# Patient Record
Sex: MALE | Race: WHITE | NOT HISPANIC OR LATINO | URBAN - METROPOLITAN AREA
[De-identification: names, ages, dates, MRNs, and addresses within clinical notes are randomized per-mention and may not be internally consistent; named-entity substitution may affect disease eponyms.]

---

## 2024-06-27 ENCOUNTER — INPATIENT (INPATIENT)
Facility: HOSPITAL | Age: 29
LOS: 1 days | Discharge: HOME | DRG: 392 | End: 2024-06-29
Attending: SURGERY | Admitting: SURGERY
Payer: MEDICAID

## 2024-06-27 VITALS
SYSTOLIC BLOOD PRESSURE: 140 MMHG | WEIGHT: 220.02 LBS | DIASTOLIC BLOOD PRESSURE: 88 MMHG | TEMPERATURE: 98 F | HEIGHT: 68 IN | OXYGEN SATURATION: 99 % | HEART RATE: 87 BPM | RESPIRATION RATE: 18 BRPM

## 2024-06-27 DIAGNOSIS — R10.9 UNSPECIFIED ABDOMINAL PAIN: ICD-10-CM

## 2024-06-27 LAB
ALBUMIN SERPL ELPH-MCNC: 4.4 G/DL — SIGNIFICANT CHANGE UP (ref 3.3–5)
ALP SERPL-CCNC: 107 U/L — SIGNIFICANT CHANGE UP (ref 40–120)
ALT FLD-CCNC: 51 U/L — HIGH (ref 10–45)
ANION GAP SERPL CALC-SCNC: 14 MMOL/L — SIGNIFICANT CHANGE UP (ref 5–17)
APPEARANCE UR: CLEAR — SIGNIFICANT CHANGE UP
AST SERPL-CCNC: 20 U/L — SIGNIFICANT CHANGE UP (ref 10–40)
BACTERIA # UR AUTO: ABNORMAL /HPF
BASOPHILS # BLD AUTO: 0.05 K/UL — SIGNIFICANT CHANGE UP (ref 0–0.2)
BASOPHILS NFR BLD AUTO: 0.3 % — SIGNIFICANT CHANGE UP (ref 0–2)
BILIRUB SERPL-MCNC: 0.2 MG/DL — SIGNIFICANT CHANGE UP (ref 0.2–1.2)
BILIRUB UR-MCNC: NEGATIVE — SIGNIFICANT CHANGE UP
BUN SERPL-MCNC: 8 MG/DL — SIGNIFICANT CHANGE UP (ref 7–23)
CALCIUM SERPL-MCNC: 9.7 MG/DL — SIGNIFICANT CHANGE UP (ref 8.4–10.5)
CAST: 0 /LPF — SIGNIFICANT CHANGE UP (ref 0–4)
CHLORIDE SERPL-SCNC: 100 MMOL/L — SIGNIFICANT CHANGE UP (ref 96–108)
CO2 SERPL-SCNC: 25 MMOL/L — SIGNIFICANT CHANGE UP (ref 22–31)
COLOR SPEC: YELLOW — SIGNIFICANT CHANGE UP
CREAT SERPL-MCNC: 0.94 MG/DL — SIGNIFICANT CHANGE UP (ref 0.5–1.3)
DIFF PNL FLD: NEGATIVE — SIGNIFICANT CHANGE UP
EGFR: 113 ML/MIN/1.73M2 — SIGNIFICANT CHANGE UP
EGFR: 113 ML/MIN/1.73M2 — SIGNIFICANT CHANGE UP
EOSINOPHIL # BLD AUTO: 0.29 K/UL — SIGNIFICANT CHANGE UP (ref 0–0.5)
EOSINOPHIL NFR BLD AUTO: 1.8 % — SIGNIFICANT CHANGE UP (ref 0–6)
GLUCOSE SERPL-MCNC: 104 MG/DL — HIGH (ref 70–99)
GLUCOSE UR QL: NEGATIVE MG/DL — SIGNIFICANT CHANGE UP
HCT VFR BLD CALC: 41.2 % — SIGNIFICANT CHANGE UP (ref 39–50)
HGB BLD-MCNC: 13.6 G/DL — SIGNIFICANT CHANGE UP (ref 13–17)
IMM GRANULOCYTES NFR BLD AUTO: 1.1 % — HIGH (ref 0–0.9)
KETONES UR-MCNC: NEGATIVE MG/DL — SIGNIFICANT CHANGE UP
LEUKOCYTE ESTERASE UR-ACNC: ABNORMAL
LIDOCAIN IGE QN: 16 U/L — SIGNIFICANT CHANGE UP (ref 7–60)
LYMPHOCYTES # BLD AUTO: 14.9 % — SIGNIFICANT CHANGE UP (ref 13–44)
LYMPHOCYTES # BLD AUTO: 2.4 K/UL — SIGNIFICANT CHANGE UP (ref 1–3.3)
MCHC RBC-ENTMCNC: 28.9 PG — SIGNIFICANT CHANGE UP (ref 27–34)
MCHC RBC-ENTMCNC: 33 GM/DL — SIGNIFICANT CHANGE UP (ref 32–36)
MCV RBC AUTO: 87.7 FL — SIGNIFICANT CHANGE UP (ref 80–100)
MONOCYTES # BLD AUTO: 0.81 K/UL — SIGNIFICANT CHANGE UP (ref 0–0.9)
MONOCYTES NFR BLD AUTO: 5 % — SIGNIFICANT CHANGE UP (ref 2–14)
NEUTROPHILS # BLD AUTO: 12.4 K/UL — HIGH (ref 1.8–7.4)
NEUTROPHILS NFR BLD AUTO: 76.9 % — SIGNIFICANT CHANGE UP (ref 43–77)
NITRITE UR-MCNC: NEGATIVE — SIGNIFICANT CHANGE UP
NRBC # BLD: 0 /100 WBCS — SIGNIFICANT CHANGE UP (ref 0–0)
NRBC BLD-RTO: 0 /100 WBCS — SIGNIFICANT CHANGE UP (ref 0–0)
PH UR: 5.5 — SIGNIFICANT CHANGE UP (ref 5–8)
PLATELET # BLD AUTO: 354 K/UL — SIGNIFICANT CHANGE UP (ref 150–400)
POTASSIUM SERPL-MCNC: 4.3 MMOL/L — SIGNIFICANT CHANGE UP (ref 3.5–5.3)
POTASSIUM SERPL-SCNC: 4.3 MMOL/L — SIGNIFICANT CHANGE UP (ref 3.5–5.3)
PROT SERPL-MCNC: 7.9 G/DL — SIGNIFICANT CHANGE UP (ref 6–8.3)
PROT UR-MCNC: NEGATIVE MG/DL — SIGNIFICANT CHANGE UP
RBC # BLD: 4.7 M/UL — SIGNIFICANT CHANGE UP (ref 4.2–5.8)
RBC # FLD: 12.3 % — SIGNIFICANT CHANGE UP (ref 10.3–14.5)
RBC CASTS # UR COMP ASSIST: 0 /HPF — SIGNIFICANT CHANGE UP (ref 0–4)
SODIUM SERPL-SCNC: 139 MMOL/L — SIGNIFICANT CHANGE UP (ref 135–145)
SP GR SPEC: 1.02 — SIGNIFICANT CHANGE UP (ref 1–1.03)
SQUAMOUS # UR AUTO: 6 /HPF — HIGH (ref 0–5)
TROPONIN T, HIGH SENSITIVITY RESULT: <6 NG/L — SIGNIFICANT CHANGE UP (ref 0–51)
UROBILINOGEN FLD QL: 0.2 MG/DL — SIGNIFICANT CHANGE UP (ref 0.2–1)
WBC # BLD: 16.12 K/UL — HIGH (ref 3.8–10.5)
WBC # FLD AUTO: 16.12 K/UL — HIGH (ref 3.8–10.5)
WBC UR QL: 21 /HPF — HIGH (ref 0–5)

## 2024-06-27 PROCEDURE — 71046 X-RAY EXAM CHEST 2 VIEWS: CPT | Mod: 26

## 2024-06-27 PROCEDURE — 74177 CT ABD & PELVIS W/CONTRAST: CPT | Mod: 26,MC

## 2024-06-27 PROCEDURE — 76705 ECHO EXAM OF ABDOMEN: CPT | Mod: 26

## 2024-06-27 PROCEDURE — 99222 1ST HOSP IP/OBS MODERATE 55: CPT | Mod: 57

## 2024-06-27 PROCEDURE — 99285 EMERGENCY DEPT VISIT HI MDM: CPT

## 2024-06-27 RX ORDER — ESTRADIOL 0.05MG/24H
4 PATCH, TRANSDERMAL SEMIWEEKLY TRANSDERMAL
Refills: 0 | Status: DISCONTINUED | OUTPATIENT
Start: 2024-06-27 | End: 2024-06-29

## 2024-06-27 RX ORDER — SPIRONOLACTONE 25 MG
1 TABLET ORAL
Refills: 0 | DISCHARGE

## 2024-06-27 RX ORDER — PIPERACILLIN-TAZO-DEXTROSE,ISO 3.375G/5
3.38 IV SOLUTION, PIGGYBACK PREMIX FROZEN(ML) INTRAVENOUS ONCE
Refills: 0 | Status: COMPLETED | OUTPATIENT
Start: 2024-06-27 | End: 2024-06-27

## 2024-06-27 RX ORDER — ACETAMINOPHEN 500 MG/5ML
1000 LIQUID (ML) ORAL ONCE
Refills: 0 | Status: COMPLETED | OUTPATIENT
Start: 2024-06-27 | End: 2024-06-27

## 2024-06-27 RX ORDER — ONDANSETRON HCL/PF 4 MG/2 ML
4 VIAL (ML) INJECTION ONCE
Refills: 0 | Status: COMPLETED | OUTPATIENT
Start: 2024-06-27 | End: 2024-06-27

## 2024-06-27 RX ORDER — ESTRADIOL 0.05MG/24H
4 PATCH, TRANSDERMAL SEMIWEEKLY TRANSDERMAL
Refills: 0 | DISCHARGE

## 2024-06-27 RX ORDER — ENOXAPARIN SODIUM 100 MG/ML
40 INJECTION SUBCUTANEOUS EVERY 24 HOURS
Refills: 0 | Status: DISCONTINUED | OUTPATIENT
Start: 2024-06-27 | End: 2024-06-29

## 2024-06-27 RX ORDER — MAGNESIUM, ALUMINUM HYDROXIDE 200-200 MG
30 TABLET,CHEWABLE ORAL ONCE
Refills: 0 | Status: COMPLETED | OUTPATIENT
Start: 2024-06-27 | End: 2024-06-27

## 2024-06-27 RX ORDER — PIPERACILLIN-TAZO-DEXTROSE,ISO 3.375G/5
3.38 IV SOLUTION, PIGGYBACK PREMIX FROZEN(ML) INTRAVENOUS EVERY 8 HOURS
Refills: 0 | Status: DISCONTINUED | OUTPATIENT
Start: 2024-06-28 | End: 2024-06-29

## 2024-06-27 RX ORDER — SPIRONOLACTONE 25 MG
100 TABLET ORAL
Refills: 0 | Status: DISCONTINUED | OUTPATIENT
Start: 2024-06-27 | End: 2024-06-29

## 2024-06-27 RX ADMIN — Medication 25 GRAM(S): at 23:47

## 2024-06-27 RX ADMIN — Medication 4 MILLIGRAM(S): at 04:28

## 2024-06-27 RX ADMIN — Medication 4 MILLIGRAM(S): at 23:58

## 2024-06-27 RX ADMIN — Medication 200 GRAM(S): at 20:35

## 2024-06-27 RX ADMIN — Medication 400 MILLIGRAM(S): at 03:28

## 2024-06-27 RX ADMIN — Medication 1000 MILLILITER(S): at 03:29

## 2024-06-27 RX ADMIN — Medication 4 MILLIGRAM(S): at 04:16

## 2024-06-27 RX ADMIN — Medication 20 MILLIGRAM(S): at 03:28

## 2024-06-27 RX ADMIN — Medication 400 MILLIGRAM(S): at 11:41

## 2024-06-27 RX ADMIN — Medication 30 MILLILITER(S): at 03:28

## 2024-06-27 RX ADMIN — Medication 100 MILLIGRAM(S): at 23:58

## 2024-06-27 RX ADMIN — Medication 4 MILLIGRAM(S): at 03:28

## 2024-06-28 LAB
ALBUMIN SERPL ELPH-MCNC: 4.1 G/DL — SIGNIFICANT CHANGE UP (ref 3.3–5)
ALP SERPL-CCNC: 102 U/L — SIGNIFICANT CHANGE UP (ref 40–120)
ALT FLD-CCNC: 42 U/L — SIGNIFICANT CHANGE UP (ref 10–45)
ANION GAP SERPL CALC-SCNC: 13 MMOL/L — SIGNIFICANT CHANGE UP (ref 5–17)
APTT BLD: 29.3 SEC — SIGNIFICANT CHANGE UP (ref 24.5–35.6)
AST SERPL-CCNC: 22 U/L — SIGNIFICANT CHANGE UP (ref 10–40)
BILIRUB SERPL-MCNC: 0.5 MG/DL — SIGNIFICANT CHANGE UP (ref 0.2–1.2)
BLD GP AB SCN SERPL QL: NEGATIVE — SIGNIFICANT CHANGE UP
BUN SERPL-MCNC: 8 MG/DL — SIGNIFICANT CHANGE UP (ref 7–23)
CALCIUM SERPL-MCNC: 9.5 MG/DL — SIGNIFICANT CHANGE UP (ref 8.4–10.5)
CHLORIDE SERPL-SCNC: 100 MMOL/L — SIGNIFICANT CHANGE UP (ref 96–108)
CO2 SERPL-SCNC: 24 MMOL/L — SIGNIFICANT CHANGE UP (ref 22–31)
CREAT SERPL-MCNC: 0.96 MG/DL — SIGNIFICANT CHANGE UP (ref 0.5–1.3)
EGFR: 110 ML/MIN/1.73M2 — SIGNIFICANT CHANGE UP
EGFR: 110 ML/MIN/1.73M2 — SIGNIFICANT CHANGE UP
GLUCOSE SERPL-MCNC: 84 MG/DL — SIGNIFICANT CHANGE UP (ref 70–99)
HCT VFR BLD CALC: 39 % — SIGNIFICANT CHANGE UP (ref 39–50)
HGB BLD-MCNC: 12.8 G/DL — LOW (ref 13–17)
INR BLD: 1.04 RATIO — SIGNIFICANT CHANGE UP (ref 0.85–1.18)
MAGNESIUM SERPL-MCNC: 2.3 MG/DL — SIGNIFICANT CHANGE UP (ref 1.6–2.6)
MCHC RBC-ENTMCNC: 28.6 PG — SIGNIFICANT CHANGE UP (ref 27–34)
MCHC RBC-ENTMCNC: 32.8 GM/DL — SIGNIFICANT CHANGE UP (ref 32–36)
MCV RBC AUTO: 87.2 FL — SIGNIFICANT CHANGE UP (ref 80–100)
NRBC # BLD: 0 /100 WBCS — SIGNIFICANT CHANGE UP (ref 0–0)
NRBC BLD-RTO: 0 /100 WBCS — SIGNIFICANT CHANGE UP (ref 0–0)
PHOSPHATE SERPL-MCNC: 2.8 MG/DL — SIGNIFICANT CHANGE UP (ref 2.5–4.5)
PLATELET # BLD AUTO: 337 K/UL — SIGNIFICANT CHANGE UP (ref 150–400)
POTASSIUM SERPL-MCNC: 3.9 MMOL/L — SIGNIFICANT CHANGE UP (ref 3.5–5.3)
POTASSIUM SERPL-SCNC: 3.9 MMOL/L — SIGNIFICANT CHANGE UP (ref 3.5–5.3)
PROT SERPL-MCNC: 7.4 G/DL — SIGNIFICANT CHANGE UP (ref 6–8.3)
PROTHROM AB SERPL-ACNC: 11.4 SEC — SIGNIFICANT CHANGE UP (ref 9.5–13)
RBC # BLD: 4.47 M/UL — SIGNIFICANT CHANGE UP (ref 4.2–5.8)
RBC # FLD: 12.4 % — SIGNIFICANT CHANGE UP (ref 10.3–14.5)
RH IG SCN BLD-IMP: POSITIVE — SIGNIFICANT CHANGE UP
SODIUM SERPL-SCNC: 137 MMOL/L — SIGNIFICANT CHANGE UP (ref 135–145)
WBC # BLD: 11.82 K/UL — HIGH (ref 3.8–10.5)
WBC # FLD AUTO: 11.82 K/UL — HIGH (ref 3.8–10.5)

## 2024-06-28 PROCEDURE — 78830 RP LOCLZJ TUM SPECT W/CT 1: CPT | Mod: 26

## 2024-06-28 PROCEDURE — 78227 HEPATOBIL SYST IMAGE W/DRUG: CPT | Mod: 26

## 2024-06-28 RX ADMIN — Medication 25 GRAM(S): at 14:21

## 2024-06-28 RX ADMIN — Medication 25 GRAM(S): at 05:49

## 2024-06-28 RX ADMIN — Medication 100 MILLIGRAM(S): at 19:01

## 2024-06-28 RX ADMIN — ENOXAPARIN SODIUM 40 MILLIGRAM(S): 100 INJECTION SUBCUTANEOUS at 05:49

## 2024-06-28 RX ADMIN — Medication 4 MILLIGRAM(S): at 14:19

## 2024-06-28 RX ADMIN — Medication 100 MILLIGRAM(S): at 14:19

## 2024-06-28 RX ADMIN — Medication 25 GRAM(S): at 21:07

## 2024-06-28 RX ADMIN — Medication 4 MILLIGRAM(S): at 19:01

## 2024-06-29 VITALS
OXYGEN SATURATION: 96 % | TEMPERATURE: 98 F | DIASTOLIC BLOOD PRESSURE: 74 MMHG | HEART RATE: 70 BPM | RESPIRATION RATE: 18 BRPM | SYSTOLIC BLOOD PRESSURE: 114 MMHG

## 2024-06-29 LAB
ALBUMIN SERPL ELPH-MCNC: 4.4 G/DL — SIGNIFICANT CHANGE UP (ref 3.3–5)
ALP SERPL-CCNC: 112 U/L — SIGNIFICANT CHANGE UP (ref 40–120)
ALT FLD-CCNC: 41 U/L — SIGNIFICANT CHANGE UP (ref 10–45)
ANION GAP SERPL CALC-SCNC: 13 MMOL/L — SIGNIFICANT CHANGE UP (ref 5–17)
AST SERPL-CCNC: 22 U/L — SIGNIFICANT CHANGE UP (ref 10–40)
BILIRUB DIRECT SERPL-MCNC: 0.1 MG/DL — SIGNIFICANT CHANGE UP (ref 0–0.3)
BILIRUB INDIRECT FLD-MCNC: 0.5 MG/DL — SIGNIFICANT CHANGE UP (ref 0.2–1)
BILIRUB SERPL-MCNC: 0.6 MG/DL — SIGNIFICANT CHANGE UP (ref 0.2–1.2)
BUN SERPL-MCNC: 8 MG/DL — SIGNIFICANT CHANGE UP (ref 7–23)
CALCIUM SERPL-MCNC: 9.8 MG/DL — SIGNIFICANT CHANGE UP (ref 8.4–10.5)
CHLORIDE SERPL-SCNC: 101 MMOL/L — SIGNIFICANT CHANGE UP (ref 96–108)
CO2 SERPL-SCNC: 25 MMOL/L — SIGNIFICANT CHANGE UP (ref 22–31)
CREAT SERPL-MCNC: 1.05 MG/DL — SIGNIFICANT CHANGE UP (ref 0.5–1.3)
EGFR: 99 ML/MIN/1.73M2 — SIGNIFICANT CHANGE UP
EGFR: 99 ML/MIN/1.73M2 — SIGNIFICANT CHANGE UP
GLUCOSE SERPL-MCNC: 79 MG/DL — SIGNIFICANT CHANGE UP (ref 70–99)
HCT VFR BLD CALC: 40.6 % — SIGNIFICANT CHANGE UP (ref 39–50)
HGB BLD-MCNC: 13.3 G/DL — SIGNIFICANT CHANGE UP (ref 13–17)
MAGNESIUM SERPL-MCNC: 2.2 MG/DL — SIGNIFICANT CHANGE UP (ref 1.6–2.6)
MCHC RBC-ENTMCNC: 28.7 PG — SIGNIFICANT CHANGE UP (ref 27–34)
MCHC RBC-ENTMCNC: 32.8 GM/DL — SIGNIFICANT CHANGE UP (ref 32–36)
MCV RBC AUTO: 87.7 FL — SIGNIFICANT CHANGE UP (ref 80–100)
NRBC # BLD: 0 /100 WBCS — SIGNIFICANT CHANGE UP (ref 0–0)
NRBC BLD-RTO: 0 /100 WBCS — SIGNIFICANT CHANGE UP (ref 0–0)
PHOSPHATE SERPL-MCNC: 3.3 MG/DL — SIGNIFICANT CHANGE UP (ref 2.5–4.5)
PLATELET # BLD AUTO: 366 K/UL — SIGNIFICANT CHANGE UP (ref 150–400)
POTASSIUM SERPL-MCNC: 4 MMOL/L — SIGNIFICANT CHANGE UP (ref 3.5–5.3)
POTASSIUM SERPL-SCNC: 4 MMOL/L — SIGNIFICANT CHANGE UP (ref 3.5–5.3)
PROT SERPL-MCNC: 7.7 G/DL — SIGNIFICANT CHANGE UP (ref 6–8.3)
RBC # BLD: 4.63 M/UL — SIGNIFICANT CHANGE UP (ref 4.2–5.8)
RBC # FLD: 12.4 % — SIGNIFICANT CHANGE UP (ref 10.3–14.5)
SODIUM SERPL-SCNC: 139 MMOL/L — SIGNIFICANT CHANGE UP (ref 135–145)
WBC # BLD: 9.22 K/UL — SIGNIFICANT CHANGE UP (ref 3.8–10.5)
WBC # FLD AUTO: 9.22 K/UL — SIGNIFICANT CHANGE UP (ref 3.8–10.5)

## 2024-06-29 PROCEDURE — 86850 RBC ANTIBODY SCREEN: CPT

## 2024-06-29 PROCEDURE — 85027 COMPLETE CBC AUTOMATED: CPT

## 2024-06-29 PROCEDURE — 78227 HEPATOBIL SYST IMAGE W/DRUG: CPT | Mod: MC

## 2024-06-29 PROCEDURE — 96374 THER/PROPH/DIAG INJ IV PUSH: CPT

## 2024-06-29 PROCEDURE — 86901 BLOOD TYPING SEROLOGIC RH(D): CPT

## 2024-06-29 PROCEDURE — 85610 PROTHROMBIN TIME: CPT

## 2024-06-29 PROCEDURE — 83735 ASSAY OF MAGNESIUM: CPT

## 2024-06-29 PROCEDURE — 84484 ASSAY OF TROPONIN QUANT: CPT

## 2024-06-29 PROCEDURE — 78830 RP LOCLZJ TUM SPECT W/CT 1: CPT

## 2024-06-29 PROCEDURE — A9537: CPT

## 2024-06-29 PROCEDURE — 83690 ASSAY OF LIPASE: CPT

## 2024-06-29 PROCEDURE — 84100 ASSAY OF PHOSPHORUS: CPT

## 2024-06-29 PROCEDURE — 86900 BLOOD TYPING SEROLOGIC ABO: CPT

## 2024-06-29 PROCEDURE — 96376 TX/PRO/DX INJ SAME DRUG ADON: CPT

## 2024-06-29 PROCEDURE — 80076 HEPATIC FUNCTION PANEL: CPT

## 2024-06-29 PROCEDURE — 71046 X-RAY EXAM CHEST 2 VIEWS: CPT

## 2024-06-29 PROCEDURE — 81001 URINALYSIS AUTO W/SCOPE: CPT

## 2024-06-29 PROCEDURE — 76705 ECHO EXAM OF ABDOMEN: CPT

## 2024-06-29 PROCEDURE — 80053 COMPREHEN METABOLIC PANEL: CPT

## 2024-06-29 PROCEDURE — 36415 COLL VENOUS BLD VENIPUNCTURE: CPT

## 2024-06-29 PROCEDURE — 85730 THROMBOPLASTIN TIME PARTIAL: CPT

## 2024-06-29 PROCEDURE — 85025 COMPLETE CBC W/AUTO DIFF WBC: CPT

## 2024-06-29 PROCEDURE — 96375 TX/PRO/DX INJ NEW DRUG ADDON: CPT

## 2024-06-29 PROCEDURE — 74177 CT ABD & PELVIS W/CONTRAST: CPT | Mod: MC

## 2024-06-29 PROCEDURE — 80048 BASIC METABOLIC PNL TOTAL CA: CPT

## 2024-06-29 PROCEDURE — 99285 EMERGENCY DEPT VISIT HI MDM: CPT

## 2024-06-29 RX ADMIN — Medication 4 MILLIGRAM(S): at 17:05

## 2024-06-29 RX ADMIN — Medication 100 MILLIGRAM(S): at 05:40

## 2024-06-29 RX ADMIN — ENOXAPARIN SODIUM 40 MILLIGRAM(S): 100 INJECTION SUBCUTANEOUS at 05:39

## 2024-06-29 RX ADMIN — Medication 100 MILLIGRAM(S): at 17:07

## 2024-06-29 RX ADMIN — Medication 4 MILLIGRAM(S): at 05:41

## 2024-06-29 RX ADMIN — Medication 25 GRAM(S): at 05:39

## 2024-07-18 PROBLEM — Z00.00 ENCOUNTER FOR PREVENTIVE HEALTH EXAMINATION: Status: ACTIVE | Noted: 2024-07-18

## 2024-07-22 ENCOUNTER — APPOINTMENT (OUTPATIENT)
Dept: TRAUMA SURGERY | Facility: CLINIC | Age: 29
End: 2024-07-22

## 2024-08-26 ENCOUNTER — APPOINTMENT (OUTPATIENT)
Dept: TRAUMA SURGERY | Facility: CLINIC | Age: 29
End: 2024-08-26
Payer: MEDICAID

## 2024-08-26 PROCEDURE — 99202 OFFICE O/P NEW SF 15 MIN: CPT

## 2024-08-26 NOTE — ASSESSMENT
[FreeTextEntry1] : - Currently asymptomatic. - Discussed risk, benefit and alternatives to surgery. - Patient would like to wait for now and see if pain recurs. - RTC PRN.

## 2024-08-26 NOTE — PHYSICAL EXAM
[JVD] : no jugular venous distention  [Normal Breath Sounds] : Normal breath sounds [Normal Heart Sounds] : normal heart sounds [Alert] : alert [Calm] : calm [de-identified] : no distress noted [de-identified] : Soft, non-tender. Wounds C/D/I.

## 2024-08-26 NOTE — HISTORY OF PRESENT ILLNESS
[FreeTextEntry1] : 30 yo, s/p subtotal cholecystectomy with recurrent RUQ pain. Currently asymptomatic.

## 2025-03-03 ENCOUNTER — EMERGENCY (EMERGENCY)
Facility: HOSPITAL | Age: 30
LOS: 1 days | Discharge: HOME | End: 2025-03-03
Attending: EMERGENCY MEDICINE
Payer: MEDICAID

## 2025-03-03 VITALS
OXYGEN SATURATION: 99 % | HEART RATE: 71 BPM | RESPIRATION RATE: 16 BRPM | SYSTOLIC BLOOD PRESSURE: 120 MMHG | TEMPERATURE: 98 F | DIASTOLIC BLOOD PRESSURE: 77 MMHG

## 2025-03-03 VITALS
WEIGHT: 225.09 LBS | TEMPERATURE: 99 F | HEART RATE: 90 BPM | HEIGHT: 68 IN | OXYGEN SATURATION: 99 % | DIASTOLIC BLOOD PRESSURE: 87 MMHG | SYSTOLIC BLOOD PRESSURE: 135 MMHG | RESPIRATION RATE: 18 BRPM

## 2025-03-03 VITALS
SYSTOLIC BLOOD PRESSURE: 145 MMHG | RESPIRATION RATE: 20 BRPM | HEIGHT: 68 IN | HEART RATE: 83 BPM | TEMPERATURE: 99 F | DIASTOLIC BLOOD PRESSURE: 85 MMHG | WEIGHT: 229.94 LBS | OXYGEN SATURATION: 98 %

## 2025-03-03 LAB
ALBUMIN SERPL ELPH-MCNC: 4.5 G/DL — SIGNIFICANT CHANGE UP (ref 3.3–5)
ALP SERPL-CCNC: 79 U/L — SIGNIFICANT CHANGE UP (ref 40–120)
ALT FLD-CCNC: 18 U/L — SIGNIFICANT CHANGE UP (ref 10–45)
ANION GAP SERPL CALC-SCNC: 14 MMOL/L — SIGNIFICANT CHANGE UP (ref 5–17)
AST SERPL-CCNC: 13 U/L — SIGNIFICANT CHANGE UP (ref 10–40)
BASOPHILS # BLD AUTO: 0.06 K/UL — SIGNIFICANT CHANGE UP (ref 0–0.2)
BASOPHILS NFR BLD AUTO: 0.4 % — SIGNIFICANT CHANGE UP (ref 0–2)
BILIRUB SERPL-MCNC: 0.2 MG/DL — SIGNIFICANT CHANGE UP (ref 0.2–1.2)
BUN SERPL-MCNC: 11 MG/DL — SIGNIFICANT CHANGE UP (ref 7–23)
CALCIUM SERPL-MCNC: 9.8 MG/DL — SIGNIFICANT CHANGE UP (ref 8.4–10.5)
CHLORIDE SERPL-SCNC: 104 MMOL/L — SIGNIFICANT CHANGE UP (ref 96–108)
CO2 SERPL-SCNC: 24 MMOL/L — SIGNIFICANT CHANGE UP (ref 22–31)
CREAT SERPL-MCNC: 0.87 MG/DL — SIGNIFICANT CHANGE UP (ref 0.5–1.3)
EGFR: 120 ML/MIN/1.73M2 — SIGNIFICANT CHANGE UP
EOSINOPHIL # BLD AUTO: 0.26 K/UL — SIGNIFICANT CHANGE UP (ref 0–0.5)
EOSINOPHIL NFR BLD AUTO: 1.7 % — SIGNIFICANT CHANGE UP (ref 0–6)
GLUCOSE SERPL-MCNC: 103 MG/DL — HIGH (ref 70–99)
HCT VFR BLD CALC: 38.7 % — LOW (ref 39–50)
HGB BLD-MCNC: 12.9 G/DL — LOW (ref 13–17)
IMM GRANULOCYTES NFR BLD AUTO: 0.5 % — SIGNIFICANT CHANGE UP (ref 0–0.9)
LYMPHOCYTES # BLD AUTO: 20.6 % — SIGNIFICANT CHANGE UP (ref 13–44)
LYMPHOCYTES # BLD AUTO: 3.14 K/UL — SIGNIFICANT CHANGE UP (ref 1–3.3)
MCHC RBC-ENTMCNC: 29.3 PG — SIGNIFICANT CHANGE UP (ref 27–34)
MCHC RBC-ENTMCNC: 33.3 G/DL — SIGNIFICANT CHANGE UP (ref 32–36)
MCV RBC AUTO: 87.8 FL — SIGNIFICANT CHANGE UP (ref 80–100)
MONOCYTES # BLD AUTO: 0.98 K/UL — HIGH (ref 0–0.9)
MONOCYTES NFR BLD AUTO: 6.4 % — SIGNIFICANT CHANGE UP (ref 2–14)
NEUTROPHILS # BLD AUTO: 10.74 K/UL — HIGH (ref 1.8–7.4)
NEUTROPHILS NFR BLD AUTO: 70.4 % — SIGNIFICANT CHANGE UP (ref 43–77)
NRBC BLD AUTO-RTO: 0 /100 WBCS — SIGNIFICANT CHANGE UP (ref 0–0)
PLATELET # BLD AUTO: 356 K/UL — SIGNIFICANT CHANGE UP (ref 150–400)
POTASSIUM SERPL-MCNC: 3.9 MMOL/L — SIGNIFICANT CHANGE UP (ref 3.5–5.3)
POTASSIUM SERPL-SCNC: 3.9 MMOL/L — SIGNIFICANT CHANGE UP (ref 3.5–5.3)
PROT SERPL-MCNC: 7.7 G/DL — SIGNIFICANT CHANGE UP (ref 6–8.3)
RBC # BLD: 4.41 M/UL — SIGNIFICANT CHANGE UP (ref 4.2–5.8)
RBC # FLD: 12.6 % — SIGNIFICANT CHANGE UP (ref 10.3–14.5)
SODIUM SERPL-SCNC: 142 MMOL/L — SIGNIFICANT CHANGE UP (ref 135–145)
WBC # BLD: 15.26 K/UL — HIGH (ref 3.8–10.5)
WBC # FLD AUTO: 15.26 K/UL — HIGH (ref 3.8–10.5)

## 2025-03-03 PROCEDURE — 64400 NJX AA&/STRD TRIGEMINAL NRV: CPT

## 2025-03-03 PROCEDURE — 64400 NJX AA&/STRD TRIGEMINAL NRV: CPT | Mod: LT

## 2025-03-03 PROCEDURE — 70487 CT MAXILLOFACIAL W/DYE: CPT | Mod: 26

## 2025-03-03 PROCEDURE — 96374 THER/PROPH/DIAG INJ IV PUSH: CPT | Mod: XU

## 2025-03-03 PROCEDURE — 85025 COMPLETE CBC W/AUTO DIFF WBC: CPT

## 2025-03-03 PROCEDURE — 70487 CT MAXILLOFACIAL W/DYE: CPT | Mod: MC

## 2025-03-03 PROCEDURE — 99283 EMERGENCY DEPT VISIT LOW MDM: CPT | Mod: 25

## 2025-03-03 PROCEDURE — 99285 EMERGENCY DEPT VISIT HI MDM: CPT | Mod: 25

## 2025-03-03 PROCEDURE — 99284 EMERGENCY DEPT VISIT MOD MDM: CPT | Mod: 25

## 2025-03-03 PROCEDURE — 80053 COMPREHEN METABOLIC PANEL: CPT

## 2025-03-03 PROCEDURE — 64400 NJX AA&/STRD TRIGEMINAL NRV: CPT | Mod: LT,59

## 2025-03-03 RX ORDER — LIDOCAINE HCL/PF 10 MG/ML
10 VIAL (ML) INJECTION ONCE
Refills: 0 | Status: COMPLETED | OUTPATIENT
Start: 2025-03-03 | End: 2025-03-03

## 2025-03-03 RX ORDER — OXYCODONE HYDROCHLORIDE 30 MG/1
1 TABLET ORAL
Qty: 6 | Refills: 0
Start: 2025-03-03 | End: 2025-03-04

## 2025-03-03 RX ORDER — BUPIVACAINE HYDROCHLORIDE 5 MG/ML
10 INJECTION, SOLUTION EPIDURAL; INTRACAUDAL; PERINEURAL ONCE
Refills: 0 | Status: DISCONTINUED | OUTPATIENT
Start: 2025-03-03 | End: 2025-03-03

## 2025-03-03 RX ORDER — OXYCODONE HYDROCHLORIDE 30 MG/1
5 TABLET ORAL ONCE
Refills: 0 | Status: DISCONTINUED | OUTPATIENT
Start: 2025-03-03 | End: 2025-03-03

## 2025-03-03 RX ORDER — BUPIVACAINE HYDROCHLORIDE 5 MG/ML
5 INJECTION, SOLUTION EPIDURAL; INTRACAUDAL; PERINEURAL ONCE
Refills: 0 | Status: COMPLETED | OUTPATIENT
Start: 2025-03-03 | End: 2025-03-03

## 2025-03-03 RX ORDER — IBUPROFEN 200 MG
1 TABLET ORAL
Qty: 15 | Refills: 0
Start: 2025-03-03 | End: 2025-03-07

## 2025-03-03 RX ORDER — ACETAMINOPHEN 500 MG/5ML
2 LIQUID (ML) ORAL
Qty: 56 | Refills: 0
Start: 2025-03-03 | End: 2025-03-09

## 2025-03-03 RX ORDER — KETOROLAC TROMETHAMINE 30 MG/ML
15 INJECTION, SOLUTION INTRAMUSCULAR; INTRAVENOUS ONCE
Refills: 0 | Status: DISCONTINUED | OUTPATIENT
Start: 2025-03-03 | End: 2025-03-03

## 2025-03-03 RX ORDER — IBUPROFEN 200 MG
600 TABLET ORAL ONCE
Refills: 0 | Status: COMPLETED | OUTPATIENT
Start: 2025-03-03 | End: 2025-03-03

## 2025-03-03 RX ADMIN — BUPIVACAINE HYDROCHLORIDE 5 MILLILITER(S): 5 INJECTION, SOLUTION EPIDURAL; INTRACAUDAL; PERINEURAL at 21:53

## 2025-03-03 RX ADMIN — KETOROLAC TROMETHAMINE 15 MILLIGRAM(S): 30 INJECTION, SOLUTION INTRAMUSCULAR; INTRAVENOUS at 02:40

## 2025-03-03 RX ADMIN — OXYCODONE HYDROCHLORIDE 5 MILLIGRAM(S): 30 TABLET ORAL at 21:53

## 2025-03-03 RX ADMIN — Medication 10 MILLILITER(S): at 21:53

## 2025-03-03 RX ADMIN — Medication 600 MILLIGRAM(S): at 22:55

## 2025-03-03 RX ADMIN — KETOROLAC TROMETHAMINE 15 MILLIGRAM(S): 30 INJECTION, SOLUTION INTRAMUSCULAR; INTRAVENOUS at 01:36

## 2025-03-03 RX ADMIN — OXYCODONE HYDROCHLORIDE 5 MILLIGRAM(S): 30 TABLET ORAL at 21:03

## 2025-03-03 RX ADMIN — Medication 1000 MILLILITER(S): at 01:36

## 2025-03-03 NOTE — ED PROVIDER NOTE - PHYSICAL EXAMINATION
PHYSICAL EXAM:  GENERAL: NAD, lying in bed comfortably  HEAD:  Atraumatic, Normocephalic  EYES: EOMI, PERRLA, conjunctiva and sclera clear  ENT: fractured tooth 1st molar left side - no discharge/bleeding; TMs clear b/l  NECK: Supple  LUNG: CTA b/l; no r/r/w  HEART: RRR, +S1/S2; No m/r/g  ABDOMEN: soft, NT/ND; BS audible   EXTREMITIES:  2+ Peripheral Pulses, brisk cap refill.   NERVOUS SYSTEM:  AAOx3, speech clear. No sensory/motor deficits   SKIN: No rashes or lesions

## 2025-03-03 NOTE — ED PROVIDER NOTE - CLINICAL SUMMARY MEDICAL DECISION MAKING FREE TEXT BOX
Attending MD Lam:  Patient is presenting for evaluation of pain to the left molar.  Patient states that the pain started yesterday.  She was seen in this emergency department and a dental block was performed however the block is worn off now she is complaining of recurrent severe pain.  She is taking amoxicillin as prescribed by ER physician.  No fevers or chills no difficulty opening the mouth.    Patient's vital signs are nonactionable.  She is moaning in pain during examination.  There is no external facial swelling, no trismus.  The tongue is not elevated.  There is no gingival fluctuance or tenderness.  Tooth #19 with significant caries and tender to palpation but again no surrounding fluctuance.    Patient provided analgesia with oxycodone and inferior alveolar nerve block.  At this point no signs or symptoms of severe odontogenic abscess, patient is already on oral antibiotics.  Will reassess pain levels and if improved patient would be appropriate to continue oral antibiotics as outpatient and to follow-up with oral maxillofacial surgery as she is attempting to do so already for definitive care          *The above represents an initial assessment/impression. Please refer to progress notes for potential changes in patient clinical course*

## 2025-03-03 NOTE — ED PROVIDER NOTE - NSFOLLOWUPINSTRUCTIONS_ED_ALL_ED_FT
- You were seen in the emergency department today for dental pain.    - Lab and imaging results, if performed, were discussed with you along with your discharge diagnosis.    - Follow up with your oral surgeon in one week.    - Return to the ED for any new, worsening, or concerning symptoms to you including increasing pain, fevers, pus drainage.    - Continue all prescribed medications.    - Take ibuprofen/tylenol as directed as needed for pain.     - Take oxycodone one tablet every 4-6 hrs AS NEEDED for the pain.     - Rest and keep yourself hydrated with fluids.

## 2025-03-03 NOTE — ED ADULT NURSE NOTE - OBJECTIVE STATEMENT
The pt is a 29 y Pt with no PMH pt had came in today w co LL jaw pain with a broken tooth. Pt was at hospital last night for the same pain and went to Dentist today and was refered to oral surgeon. pt was given oral pain meds today and did not feel relief. pt is in intest pain in stretcher crying at this time  in bed fully dressed at this time sobbing. pt is in hallway at this time. Pt denies head ache, chest pain sob fever, chills n/v/d/c.

## 2025-03-03 NOTE — ED PROCEDURE NOTE - ATTENDING CONTRIBUTION TO CARE
***Moreno Baker MD FACEP*** Attending physician was available for the key components of the procedure, the patient tolerated well. There were no complications with the procedure.

## 2025-03-03 NOTE — ED PROVIDER NOTE - ATTENDING CONTRIBUTION TO CARE
Chief Complaint:    - Severe left-sided lower jaw pain     HPI:    - The subject of this case is a 29-year-old transition female from male who reports experiencing a constant, severe left-sided lower jaw pain over the last three days. The pain began after the patient noticed a fracture in her first molar, a tooth that had undergone a root canal procedure last year. She noticed the fracture when he was chewing gum. The pain has reportedly been so intense that analgesics have not been able to alleviate it, making it difficult for the patient to ingest fluids. On physical examination, it was confirmed that the patient does have a fracture in the 17/18th molar. The patient has just arrived from [place] and decided to seek medical treatment for the pain.   Past Medical History:    -  The patient's past medical history includes a dental root canal procedure.   Social History:    - The patient recently travelled from [place].   Physical Examination:    -  The examination of the patient's oral cavity confirmed that there has indeed been a fracture to the first molar. The rest of the teeth seemed well aligned. The patient expressed severe pain when I attempted to touch the gum around the area with the broken tooth.   Labs and Studies:    -  A CT scan has been ordered to rule out a dental abscess.   Medical Decision Making:    -  Given the severity of the patient's pain and his inability to ingest fluids, an IV fluid and analgesics will be administered for hydration and pain management. I performed a regional block after educating the patient several potential side effects including accidental mastication of tongue due to loss of sensation. I anticipated the block would provide suitable pain relief for 7 to 10 hours. We also ordered a CT scan to rule out a dental abscess as the cause of the patient's severe pain. Post-procedure, the patient reported that the pain was better almost immediately. Follow up with a dentist was advised for further evaluation and management of the fracture.   Impression:    -  lower left-sided jaw pain due to acute fracture of 17/18th molar

## 2025-03-03 NOTE — ED PROVIDER NOTE - PATIENT PORTAL LINK FT
You can access the FollowMyHealth Patient Portal offered by [facility] by registering at the following website: [email or web address]. By joining [employer]’s FollowMyHealth portal, you will also be able to view your health information using other applications (apps) compatible with our system.

## 2025-03-03 NOTE — ED PROVIDER NOTE - OBJECTIVE STATEMENT
29 male with no significant past medical history, presenting for left-sided lower jaw pain for the past 3 days.  Patient reports fracturing first molar, since then having severe pain, has been worsening/constant since. Reports having 1 x non bloody vomiting.   Patient reports radiation of pain to left forehead, ear.  Patient gives a history of root canal procedure performed the same site  No fevers, URI symptoms, shortness of breath, chest pain, vomiting, abdominal pain.  No AC.

## 2025-03-03 NOTE — ED PROVIDER NOTE - PROGRESS NOTE DETAILS
Martin MARTINEZ: Patient re-evaluated. Pain much improved. Is pending visit to his dentist today, will send prescription for pain meds to preferred pharmacy. Patient agrees to plan. Will still request discharge centre resource to schedule rapid follow up.

## 2025-03-03 NOTE — ED PROVIDER NOTE - OBJECTIVE STATEMENT
30 yo here for pain in left molar for the past 2 days. Patient was seen in the ED yesterday for this pain and a block was performed and patient was sent home on amoxicillin however patient returning because of persistent pain in left molar. Denying fevers, chills, nausea, vomiting.

## 2025-03-03 NOTE — ED PROVIDER NOTE - NSFOLLOWUPINSTRUCTIONS_ED_ALL_ED_FT
WHAT YOU NEED TO KNOW:    What is a toothache and what causes it? A toothache is pain that is caused by irritation of the nerves in the center of your tooth. The irritation may be caused by several problems, such as a cavity, an infection, a cracked tooth, or gum disease.  Tooth Anatomy    How is a toothache diagnosed? Your healthcare provider will ask how long you have had a toothache. He or she will also ask if you have any other symptoms or medical conditions. Your healthcare provider will examine your tooth and mouth. Your provider may also examine your face and neck. You may need x-rays to check for an infection or cracked tooth.    How is a toothache treated? Treatment depends on the cause of your toothache. You may need any of the following:    NSAIDs, such as ibuprofen, help decrease swelling, pain, and fever. This medicine is available with or without a doctor's order. NSAIDs can cause stomach bleeding or kidney problems in certain people. If you take blood thinner medicine, always ask if NSAIDs are safe for you. Always read the medicine label and follow directions. Do not give these medicines to children younger than 6 months without direction from a healthcare provider.    Acetaminophen decreases pain and fever. It is available without a doctor's order. Ask how much to take and how often to take it. Follow directions. Acetaminophen can cause liver damage if not taken correctly.    Prescription pain medicine may be given. Ask your healthcare provider how to take this medicine safely. Some prescription pain medicines contain acetaminophen. Do not take other medicines that contain acetaminophen without talking to your healthcare provider. Too much acetaminophen may cause liver damage. Prescription pain medicine may cause constipation. Ask your healthcare provider how to prevent or treat constipation.    Antibiotics help treat or prevent a bacterial infection.  How can I manage my toothache?    Rinse your mouth with warm salt water 4 times a day or as directed.    Eat soft foods to help relieve pain caused by chewing.    Apply ice on your jaw or cheek for 15 to 20 minutes every hour or as directed. Use an ice pack, or put crushed ice in a plastic bag. Cover it with a towel before you apply it. Ice helps prevent tissue damage and decreases swelling and pain.  How can I help prevent a toothache?    Brush your teeth at least 2 times a day.    Use dental floss to clean between your teeth at least 1 time a day.    See your dentist regularly every 6 months for dental cleanings and oral exams.  When should I seek immediate care?    You have trouble breathing or swallowing.    You have swelling in your face or neck.  When should I contact my dentist?    You have a fever and chills.    You have trouble opening or closing your mouth.    You have swelling around your tooth.    You have questions or concerns about your condition or care.  CARE AGREEMENT:    You have the right to help plan your care. Learn about your health condition and how it may be treated. Discuss treatment options with your healthcare providers to decide what care you want to receive. You always have the right to refuse treatment.

## 2025-03-03 NOTE — ED ADULT NURSE NOTE - OBJECTIVE STATEMENT
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 Patient about a year ago she got a root canal done and last night she was chewing gum and her root canal fell out. When the canal fell out, intense pain started and has been consistent ever since. Patient she could not wait until her dentist opened because of the pain. Patient's respirations are even and unlabored, able to move all extremities, and denies SOB and chest pain. Neuro status intact. Safety measures intact, bed locked, lowered, and side rails raised.

## 2025-03-03 NOTE — ED PROVIDER NOTE - ATTENDING CONTRIBUTION TO CARE
Attending MD Lam:  I have seen and examined this patient and fully participated in the care of this patient as the teaching attending. I personally made/approved the management plan and take responsibility for the patient management.        *The above represents an initial assessment/impression. Please refer to progress notes for potential changes in patient clinical course*

## 2025-03-03 NOTE — ED ADULT TRIAGE NOTE - CHIEF COMPLAINT QUOTE
left lower dental pain, seen by dentist earlier today and referred for oral surgery. Pain now worse.

## 2025-03-03 NOTE — ED PROVIDER NOTE - NSFOLLOWUPCLINICS_GEN_ALL_ED_FT
[facility]  Dental  [address] [place] [id number]  Phone: [phone]  Fax:

## 2025-03-03 NOTE — ED PROVIDER NOTE - PATIENT PORTAL LINK FT
You can access the FollowMyHealth Patient Portal offered by [facility] by registering at the following website: [email or web address]. By joining [email or web address]’s FollowMyHealth portal, you will also be able to view your health information using other applications (apps) compatible with our system.

## 2025-03-03 NOTE — ED PROVIDER NOTE - PHYSICAL EXAMINATION
General:crying, in distress secondary to pain  HEENT: EOMI, PERRLA, normal mucosa, scattered dental carries  Neck: supple, no lymphadenopathy, full range of motion, no nuchal rigidity  CV: RRR, normal S1 and S2 with no murmur, capillary refill less than two seconds  Resp: lungs CTA b/l, good aeration bilaterally, symmetric chest wall   Abd: non-distended, soft, non-tender  : no CVA tenderness  MSK: full range of motion, no cyanosis, no edema, no clubbing, no immobility  Neuro: CN II-XII grossly intact, muscle strength 5/5 in all extremities, normal gait  Skin: no rashes, skin intact

## 2025-03-03 NOTE — ED PROVIDER NOTE - CLINICAL SUMMARY MEDICAL DECISION MAKING FREE TEXT BOX
29 male with no significant past medical history, presenting for left-sided lower jaw pain for the past 3 days. VSS. O/E fractured tooth 1st molar left side - no discharge/bleeding. ? Dental fracture ? Dental abscess. Will do labs/imaging. Shal offer pain control. Patient unable to tolerate PO - will rehydrate.